# Patient Record
Sex: MALE | Race: WHITE | URBAN - METROPOLITAN AREA
[De-identification: names, ages, dates, MRNs, and addresses within clinical notes are randomized per-mention and may not be internally consistent; named-entity substitution may affect disease eponyms.]

---

## 2021-08-08 ENCOUNTER — OFFICE VISIT (OUTPATIENT)
Dept: FAMILY MEDICINE CLINIC | Facility: CLINIC | Age: 26
End: 2021-08-08
Payer: COMMERCIAL

## 2021-08-08 VITALS
RESPIRATION RATE: 20 BRPM | OXYGEN SATURATION: 97 % | BODY MASS INDEX: 26.81 KG/M2 | TEMPERATURE: 98 F | SYSTOLIC BLOOD PRESSURE: 115 MMHG | HEART RATE: 59 BPM | WEIGHT: 181 LBS | DIASTOLIC BLOOD PRESSURE: 63 MMHG | HEIGHT: 69 IN

## 2021-08-08 DIAGNOSIS — K13.79 MOUTH SORE: Primary | ICD-10-CM

## 2021-08-08 PROCEDURE — 99202 OFFICE O/P NEW SF 15 MIN: CPT | Performed by: NURSE PRACTITIONER

## 2021-08-08 PROCEDURE — 3008F BODY MASS INDEX DOCD: CPT | Performed by: NURSE PRACTITIONER

## 2021-08-08 PROCEDURE — 3074F SYST BP LT 130 MM HG: CPT | Performed by: NURSE PRACTITIONER

## 2021-08-08 PROCEDURE — 3078F DIAST BP <80 MM HG: CPT | Performed by: NURSE PRACTITIONER

## 2021-08-08 RX ORDER — DOXYCYCLINE HYCLATE 100 MG/1
100 CAPSULE ORAL 2 TIMES DAILY
Qty: 14 CAPSULE | Refills: 0 | Status: SHIPPED | OUTPATIENT
Start: 2021-08-08 | End: 2021-08-15

## 2021-08-08 NOTE — PATIENT INSTRUCTIONS
Understanding Canker Sores  Canker sores (aphthous ulcers) are small, painful sores in the mouth. They occur most often on the tongue, gums, or insides of the cheeks. What causes a canker sore? The exact cause of canker sores is not known.  But they ar more serious illness. If you have other signs of illness along with mouth sores, talk with a healthcare provider. Canker sores can be so painful that they cause problems with talking, eating, or drinking. When should I call my healthcare provider?   Call

## 2021-08-08 NOTE — PROGRESS NOTES
CHIEF COMPLAINT:     Patient presents with: Other: mouth sore      HPI:   Nunu Ruibo is a 26year old male who presents with complaints of mouth sore.  Pt reports that he cut the back of his mouth with a toothbrush and now the area is open, thinks he ha Take 1 capsule (100 mg total) by mouth 2 (two) times daily for 7 days. Patient Instructions     Understanding Canker Sores  Canker sores (aphthous ulcers) are small, painful sores in the mouth.  They occur most often on the tongue, gums, or insides o possible complications of a canker sore? Mouth sores that seem to be canker sores can be signs of a more serious illness. If you have other signs of illness along with mouth sores, talk with a healthcare provider.  Canker sores can be so painful that they